# Patient Record
Sex: MALE | Race: WHITE | NOT HISPANIC OR LATINO | Employment: FULL TIME | URBAN - METROPOLITAN AREA
[De-identification: names, ages, dates, MRNs, and addresses within clinical notes are randomized per-mention and may not be internally consistent; named-entity substitution may affect disease eponyms.]

---

## 2022-11-14 ENCOUNTER — HOSPITAL ENCOUNTER (EMERGENCY)
Facility: HOSPITAL | Age: 47
Discharge: HOME | End: 2022-11-14
Attending: EMERGENCY MEDICINE

## 2022-11-14 ENCOUNTER — APPOINTMENT (EMERGENCY)
Dept: RADIOLOGY | Facility: HOSPITAL | Age: 47
End: 2022-11-14

## 2022-11-14 VITALS
OXYGEN SATURATION: 98 % | HEIGHT: 69 IN | DIASTOLIC BLOOD PRESSURE: 74 MMHG | RESPIRATION RATE: 18 BRPM | SYSTOLIC BLOOD PRESSURE: 136 MMHG | TEMPERATURE: 97.5 F | WEIGHT: 165 LBS | BODY MASS INDEX: 24.44 KG/M2 | HEART RATE: 87 BPM

## 2022-11-14 DIAGNOSIS — Z91.89 AT RISK FOR INFECTION ASSOCIATED WITH WOUND: ICD-10-CM

## 2022-11-14 DIAGNOSIS — S61.219A FINGER LACERATION: Primary | ICD-10-CM

## 2022-11-14 RX ORDER — CEPHALEXIN 250 MG/1
500 CAPSULE ORAL ONCE
Status: COMPLETED | OUTPATIENT
Start: 2022-11-14 | End: 2022-11-14

## 2022-11-14 RX ORDER — CEPHALEXIN 500 MG/1
500 CAPSULE ORAL EVERY 6 HOURS SCHEDULED
Qty: 28 CAPSULE | Refills: 0 | Status: SHIPPED | OUTPATIENT
Start: 2022-11-14 | End: 2022-11-21

## 2022-11-14 RX ORDER — LIDOCAINE HYDROCHLORIDE 10 MG/ML
10 INJECTION, SOLUTION EPIDURAL; INFILTRATION; INTRACAUDAL; PERINEURAL ONCE
Status: COMPLETED | OUTPATIENT
Start: 2022-11-14 | End: 2022-11-14

## 2022-11-14 RX ADMIN — CEPHALEXIN 500 MG: 250 CAPSULE ORAL at 09:37

## 2022-11-14 RX ADMIN — LIDOCAINE HYDROCHLORIDE 10 ML: 10 INJECTION, SOLUTION EPIDURAL; INFILTRATION; INTRACAUDAL; PERINEURAL at 08:44

## 2022-11-14 NOTE — ED PROVIDER NOTES
History  Chief Complaint   Patient presents with   • Finger Injury     Pt reports getting right index finger stuck in his machine, resulting in finger injury  Tetanus up to date      42-year-old male without his right index finger stuck in a sammie machine between a block and another object resulting in a crush type injury of his distal phalanx of his right index finger and laceration from lateral nail for down to padded finger  Tetanus up to date within 10 years and had Tdap in 2014  Past medical history HTN, type 2 diabetes  Current smoker  None       Past Medical History:   Diagnosis Date   • Diabetes mellitus ([facility] [address] )    • Hypertension        History reviewed  No pertinent surgical history  History reviewed  No pertinent family history  I have reviewed and agree with the history as documented  E-Cigarette/Vaping     E-Cigarette/Vaping Substances     Social History     Tobacco Use   • Smoking status: Current Every Day Smoker     Packs/day: 1 00   • Smokeless tobacco: Never Used   Substance Use Topics   • Alcohol use: Never   • Drug use: Never       Review of Systems   Musculoskeletal:        Crush injury to right index finger   Skin: Positive for wound  All other systems reviewed and are negative  Physical Exam  Physical Exam  Vitals and nursing note reviewed  Constitutional:       Appearance: He is well-developed  HENT:      Head: Normocephalic and atraumatic  Eyes:      Conjunctiva/sclera: Conjunctivae normal    Cardiovascular:      Rate and Rhythm: Normal rate and regular rhythm  Heart sounds: No murmur heard  Pulmonary:      Effort: Pulmonary effort is normal  No respiratory distress  Breath sounds: Normal breath sounds  Abdominal:      Palpations: Abdomen is soft  Tenderness: There is no abdominal tenderness  Musculoskeletal:      Cervical back: Neck supple  Comments: Right index finger with laceration from lateral nail fold down to volar pad of finger  Taisha Skipper No devitalized tissue  Non contaminated  Skin:     General: Skin is warm and dry  Neurological:      Mental Status: He is alert  Vital Signs  ED Triage Vitals [11/14/22 0837]   Temperature Pulse Respirations Blood Pressure SpO2   97 5 °F (36 4 °C) 87 18 136/74 98 %      Temp Source Heart Rate Source Patient Position - Orthostatic VS BP Location FiO2 (%)   Temporal Monitor Sitting Left arm --      Pain Score       --           Vitals:    11/14/22 0837   BP: 136/74   Pulse: 87   Patient Position - Orthostatic VS: Sitting         Visual Acuity      ED Medications  Medications   lidocaine (PF) (XYLOCAINE-MPF) 1 % injection 10 mL (10 mL Infiltration Given by Other 11/14/22 4812)   cephalexin (KEFLEX) capsule 500 mg (500 mg Oral Given 11/14/22 2074)       Diagnostic Studies  Results Reviewed     None                 XR finger second digit-index RIGHT   Final Result by Kamila Crane MD (11/14 6512)      No acute osseous abnormality  Workstation performed: [id number]                    Procedures  Laceration repair    Date/Time: 11/14/2022 8:44 AM  Performed by: Aline Meraz PA-C  Authorized by: Aline Meraz PA-C   Consent: Verbal consent obtained    Consent given by: patient  Patient understanding: patient states understanding of the procedure being performed  Patient consent: the patient's understanding of the procedure matches consent given  Patient identity confirmed: verbally with patient and hospital-assigned identification number  Body area: upper extremity  Location details: right index finger  Foreign bodies: no foreign bodies  Tendon involvement: none  Nerve involvement: none  Vascular damage: no  Anesthesia: local infiltration    Anesthesia:  Local Anesthetic: lidocaine 1% without epinephrine    Sedation:  Patient sedated: no        Procedure Details:  Irrigation solution: saline  Irrigation method: syringe  Amount of cleaning: standard  Debridement: none  Degree of undermining: none  Skin closure: Ethilon and 4-0 nylon  Number of sutures: 5  Technique: simple  Approximation: close  Approximation difficulty: simple  Dressing: 4x4 sterile gauze, antibiotic ointment and gauze roll               ED Course  ED Course as of 11/14/22 1730   Mon Nov 14, 2022   8361 Obtaining x-ray/pending images   0929 Images negative  Wound closed with 5 sutures in place  Patient agreeable to wound prophylaxis here in emergency department with Keflex                                             MDM  Number of Diagnoses or Management Options  At risk for infection associated with wound: new and requires workup  Finger laceration: new and requires workup     Amount and/or Complexity of Data Reviewed  Tests in the radiology section of CPT®: ordered and reviewed        Disposition  Final diagnoses:   Finger laceration   At risk for infection associated with wound     Time reflects when diagnosis was documented in both MDM as applicable and the Disposition within this note     Time User Action Codes Description Comment    11/14/2022  8:44 AM Aishwarya Bliss Add [H58 222H] Finger laceration     11/14/2022  9:30 AM Aishwarya Bliss Add [Z91 [id number]] At risk for infection associated with wound       ED Disposition     ED Disposition   Discharge    Condition   Stable    Date/Time   Mon Nov 14, 2022  9:30 AM    Comment   Sebastián Vega Lorena discharge to home/self care                 Follow-up Information     Follow up With Specialties Details Why Contact Info Additional Information     [address] Emergency Department Emergency Medicine In 7 days For suture removal [address] [place],[address] [id number]  [address] [facility] Emergency Department, [address] [place] [address] [address]          Discharge Medication List as of 11/14/2022  9:35 AM      START taking these medications    Details   cephalexin (KEFLEX) 500 mg capsule Take 1 capsule (500 mg total) by mouth every 6 (six) hours for 7 days, Starting Mon 11/14/2022, Until Mon 11/21/2022, Normal             No discharge procedures on file      PDMP Review     None          ED Provider  Electronically Signed by           Denzel Baxter PA-C  11/14/22 8553

## 2022-11-14 NOTE — Clinical Note
[address] was seen and treated in our emergency department on 11/14/2022  Diagnosis:     Constantino Whitt  is off the rest of the shift today, may return to work on return date  He may return on this date: 11/15/2022         If you have any questions or concerns, please don't hesitate to call        Sheryl Lee PA-C    ______________________________           _______________          _______________  Hospital Representative                              Date                                Time